# Patient Record
(demographics unavailable — no encounter records)

---

## 2024-12-12 NOTE — PHYSICAL EXAM
[Chaperone Present] : A chaperone was present in the examining room during all aspects of the physical examination [de-identified] : grade 1 cystocele. no mesh palpable. no RICHARD on supine cough stress test.  [FreeTextEntry2] : Sharita REBOLLEDO

## 2024-12-12 NOTE — HISTORY OF PRESENT ILLNESS
[FreeTextEntry1] : 2024 -- 56 F  with UUI, OAB (wet). Reports hx RICHARD s/p sling 10 years ago. RICHARD resolved as per patient- no issues. The chief complaint is urinary leakage with urge incontinence. She wears depends (relatively new to her) for leakage. Reports frequency x10 at worse. She has failed solifenacin and PNE.  No UTI symptoms today. Denies dysuria. Reports she was told by previous uro to have bladder inflammation in the past- "suds"/foam in urine.  Post menopause >10 years. Reports intermittent hot flashes- she was counseled by gyn for HRT and estrogen cream. Not sexually active at the moment (partner with prostate issues).   of note, pt works in law enforcement.  Reviewed past urodynamics results with previous uro. She has had urine cultures in the past the grew E.Coli.   PMH:  HTN  PSH: sling (10 yrs ago), tonsillectomy FH: no known  malignancies.  SocHx: non-smoker, social alcohol Meds: atorvastatin, metoprolol  Allergies: PCN

## 2024-12-12 NOTE — PHYSICAL EXAM
[Chaperone Present] : A chaperone was present in the examining room during all aspects of the physical examination [de-identified] : grade 1 cystocele. no mesh palpable. no RICHARD on supine cough stress test.  [FreeTextEntry2] : Sharita REBOLLEDO

## 2024-12-12 NOTE — ADDENDUM
[FreeTextEntry1] : A portion of this note was written by [Stevie Liang] on 12/11/2024 acting as a scribe for Dr. García.   I have personally reviewed the chart and agree that the record accurately reflects my personal performance of the history, physical exam, assessment, and plan.

## 2024-12-12 NOTE — ASSESSMENT
[FreeTextEntry1] : I discussed the findings and options with Ms. EDUAR KELLOGG in detail.   Options for management of the patient's overactive bladder and incontinence discussed at length. These included medical therapy, behavioral modification, bladder retraining, and surgical options. Surgical options for third line therapies reviewed included injection of botulinum toxin versus sacral nerve stimulation therapy.   - We discussed urodynamics in great detail to obtain more objective functional information on her bladder- and see if she is a good candidate for botox.  - We also discussed cystoscopy as additional testing given questionable hx bladder inflammation, s/p sling to r/o mesh.  The procedure was discussed in great detail with their risks and benefits. - Urine sent for UA UC.   Return in office for urodynamics and cystoscopy.  Patient expressed understanding.    The total amount of time I have personally spent preparing for this visit, reviewing the patient's test results, obtaining external history, ordering tests/medications, documenting clinical information, communicating with and counseling the patient/family and/or caregiver(s), reviewing old records, and spent face to face with the patient explaining the above was 45 minutes.

## 2025-03-27 NOTE — HISTORY OF PRESENT ILLNESS
[FreeTextEntry1] : 2025 -- 56 F  with UUI, OAB (wet). Reports hx RICHARD s/p sling 10 years ago. RICHARD resolved as per patient- no issues. The chief complaint is urinary leakage with urge incontinence.  She presents today for urodynamics and cystoscopy.    2024 -- 56 F  with UUI, OAB (wet). Reports hx RICHARD s/p sling 10 years ago. RICHARD resolved as per patient- no issues. The chief complaint is urinary leakage with urge incontinence. She wears depends (relatively new to her) for leakage. Reports frequency x10 at worse. She has failed solifenacin and PNE.  No UTI symptoms today. Denies dysuria. Reports she was told by previous uro to have bladder inflammation in the past- "suds"/foam in urine.  Post menopause >10 years. Reports intermittent hot flashes- she was counseled by gyn for HRT and estrogen cream. Not sexually active at the moment (partner with prostate issues).   of note, pt works in law enforcement.  Reviewed past urodynamics results with previous uro. She has had urine cultures in the past the grew E.Coli.   PMH:  HTN  PSH: sling (10 yrs ago), tonsillectomy FH: no known  malignancies.  SocHx: non-smoker, social alcohol Meds: atorvastatin, metoprolol  Allergies: PCN

## 2025-03-27 NOTE — PHYSICAL EXAM
[Chaperone Present] : A chaperone was present in the examining room during all aspects of the physical examination [de-identified] : grade 1 cystocele. no mesh palpable. no RICHARD on supine cough stress test.  [FreeTextEntry2] : Sharita REBOLLEDO

## 2025-03-27 NOTE — ADDENDUM
[FreeTextEntry1] : A portion of this note was written by [Stevie Liang] on 03/27/2025 acting as a scribe for Dr. García.   I have personally reviewed the chart and agree that the record accurately reflects my personal performance of the history, physical exam, assessment, and plan.

## 2025-03-27 NOTE — PHYSICAL EXAM
[Chaperone Present] : A chaperone was present in the examining room during all aspects of the physical examination [de-identified] : grade 1 cystocele. no mesh palpable. no RICHARD on supine cough stress test.  [FreeTextEntry2] : Sharita REBOLLEDO

## 2025-03-27 NOTE — ASSESSMENT
[FreeTextEntry1] : I discussed the findings and options with Ms. EDUAR KELLOGG in detail.   Urodynamic Interpretation:   Normal bladder sensation. Decreased bladder capacity. Patient experiencing detrusor instability. The uroflow rate is normal. The uroflow pattern is normal. The detrusor contractility is normal. The intravesical voiding pressure is increased. The patient has complete bladder emptying, a post void residual of 0 cc. The spincter activity is abnormal dyssynergic.  Normal cystoscopy.  No bladder tumor visualized. Mucosal abnormality was not present in the bladder wall. The bladder wall demonstrates no trabeculation.    We discussed options including intravesical botox. We discussed risks, benefits and alternatives and she wishes to proceed. -  pre-procedure valium sent to pharmacy.  Return in office for botox.  Patient expressed understanding.

## 2025-04-24 NOTE — HISTORY OF PRESENT ILLNESS
[FreeTextEntry1] : 2025 -- 57 F  with UUI, OAB (wet). Reports hx RICHARD s/p sling 10 years ago. RICHARD resolved as per patient- no issues.   She presents today for intravesical botox injection.   2025 -- 56 F  with UUI, OAB (wet). Reports hx RICHARD s/p sling 10 years ago. RICHARD resolved as per patient- no issues. The chief complaint is urinary leakage with urge incontinence.  She presents today for urodynamics and cystoscopy.    2024 -- 56 F  with UUI, OAB (wet). Reports hx RICHARD s/p sling 10 years ago. RICHARD resolved as per patient- no issues. The chief complaint is urinary leakage with urge incontinence. She wears depends (relatively new to her) for leakage. Reports frequency x10 at worse. She has failed solifenacin and PNE.  No UTI symptoms today. Denies dysuria. Reports she was told by previous uro to have bladder inflammation in the past- "suds"/foam in urine.  Post menopause >10 years. Reports intermittent hot flashes- she was counseled by gyn for HRT and estrogen cream. Not sexually active at the moment (partner with prostate issues).   of note, pt works in law enforcement.  Reviewed past urodynamics results with previous uro. She has had urine cultures in the past the grew E.Coli.   PMH:  HTN  PSH: sling (10 yrs ago), tonsillectomy FH: no known  malignancies.  SocHx: non-smoker, social alcohol Meds: atorvastatin, metoprolol  Allergies: PCN

## 2025-04-24 NOTE — ADDENDUM
[FreeTextEntry1] : A portion of this note was written by [Stevie Liang] on 04/24/2025 acting as a scribe for Dr. García.   I have personally reviewed the chart and agree that the record accurately reflects my personal performance of the history, physical exam, assessment, and plan.

## 2025-04-24 NOTE — ASSESSMENT
[FreeTextEntry1] : I discussed the findings and options with Ms. EDUAR KELLOGG in detail.   She presents today for intravesical botox injection.   A total of 100 units of botulinum toxin was reconstituted into 10 cc of injectable saline. A total of 10 individual 1 cc injections were carried out diffusely throughout the bladder wall in the supra trigonal region. There was no active bleeding noted at the end of the procedure. The patient tolerated procedure well.  Urine was sent for culture to r/o infection.    She will return in 7-10 days for post void residual. Patient expressed understanding.

## 2025-04-24 NOTE — PHYSICAL EXAM
[de-identified] : grade 1 cystocele. no mesh palpable. no RICHARD on supine cough stress test.  [FreeTextEntry2] : Sharita REBOLLEDO

## 2025-06-23 NOTE — PHYSICAL EXAM
[de-identified] : grade 1 cystocele. no mesh palpable. no RICHARD on supine cough stress test.  [FreeTextEntry2] : Sharita REBOLLEDO

## 2025-06-23 NOTE — ASSESSMENT
[FreeTextEntry1] : I discussed the findings and options with Ms. EDUAR KELLOGG in detail.   Ms. KELLOGG reports gradual improvement s/p botox 6/2/25. Today's PVR is 85cc (done to rule out incomplete bladder emptying). She presents well managed with no new urinary complaints.  Patient is satisfied and happy.   Discussed repeat botox to be done in the OR (injected 200cc in the OR).  She met with the surgical coordinator at the end of today's visit to discuss next steps (i.e., pre-op clearance, scheduling).  All of her questions were answered. Patient expressed understanding.

## 2025-06-23 NOTE — PHYSICAL EXAM
[de-identified] : grade 1 cystocele. no mesh palpable. no RICHARD on supine cough stress test.  [FreeTextEntry2] : Sharita REBOLLEDO

## 2025-06-23 NOTE — HISTORY OF PRESENT ILLNESS
[FreeTextEntry1] : 2025 -- 57 F  with UUI, OAB (wet). Reports hx RICHARD s/p sling 10 years ago. She is s/p cystoscopy with botox 25 (done in the OR).   Patient states improvement s/p botox- she is satisfied and happy.   PVR: 85cc (done to rule out incomplete bladder emptying).   2025 --   57 F  with UUI, OAB (wet). Reports hx RICHARD s/p sling 10 years ago.    Reports no improvement s/p botox 25.    PVR: 9cc (done to rule out incomplete bladder emptying).   2025 -- 57 F  with UUI, OAB (wet). Reports hx RICHARD s/p sling 10 years ago. RICHARD resolved as per patient- no issues.   She presents today for intravesical botox injection.   2025 -- 56 F  with UUI, OAB (wet). Reports hx RICHARD s/p sling 10 years ago. RICHARD resolved as per patient- no issues. The chief complaint is urinary leakage with urge incontinence.  She presents today for urodynamics and cystoscopy.    2024 -- 56 F  with UUI, OAB (wet). Reports hx RICHARD s/p sling 10 years ago. RICHARD resolved as per patient- no issues. The chief complaint is urinary leakage with urge incontinence. She wears depends (relatively new to her) for leakage. Reports frequency x10 at worse. She has failed solifenacin and PNE.  No UTI symptoms today. Denies dysuria. Reports she was told by previous uro to have bladder inflammation in the past- "suds"/foam in urine.  Post menopause >10 years. Reports intermittent hot flashes- she was counseled by gyn for HRT and estrogen cream. Not sexually active at the moment (partner with prostate issues).   of note, pt works in law enforcement.  Reviewed past urodynamics results with previous uro. She has had urine cultures in the past the grew E.Coli.   PMH:  HTN  PSH: sling (10 yrs ago), tonsillectomy FH: no known  malignancies.  SocHx: non-smoker, social alcohol Meds: atorvastatin, metoprolol  Allergies: PCN

## 2025-06-23 NOTE — ADDENDUM
[FreeTextEntry1] : A portion of this note was written by [Stevie Liang] on 06/23/2025 acting as a scribe for Dr. García.   I have personally reviewed the chart and agree that the record accurately reflects my personal performance of the history, physical exam, assessment, and plan.